# Patient Record
Sex: FEMALE | Race: ASIAN | NOT HISPANIC OR LATINO
[De-identification: names, ages, dates, MRNs, and addresses within clinical notes are randomized per-mention and may not be internally consistent; named-entity substitution may affect disease eponyms.]

---

## 2021-01-05 ENCOUNTER — DASHBOARD ENCOUNTERS (OUTPATIENT)
Age: 51
End: 2021-01-05

## 2021-01-05 ENCOUNTER — OFFICE VISIT (OUTPATIENT)
Dept: URBAN - METROPOLITAN AREA CLINIC 23 | Facility: CLINIC | Age: 51
End: 2021-01-05
Payer: COMMERCIAL

## 2021-01-05 ENCOUNTER — WEB ENCOUNTER (OUTPATIENT)
Dept: URBAN - METROPOLITAN AREA CLINIC 23 | Facility: CLINIC | Age: 51
End: 2021-01-05

## 2021-01-05 ENCOUNTER — LAB OUTSIDE AN ENCOUNTER (OUTPATIENT)
Dept: URBAN - METROPOLITAN AREA CLINIC 23 | Facility: CLINIC | Age: 51
End: 2021-01-05

## 2021-01-05 DIAGNOSIS — Z87.19 HISTORY OF GASTRIC POLYP: ICD-10-CM

## 2021-01-05 DIAGNOSIS — K29.50 OTHER CHRONIC GASTRITIS WITHOUT HEMORRHAGE: ICD-10-CM

## 2021-01-05 DIAGNOSIS — Z86.010 HISTORY OF ADENOMATOUS POLYP OF COLON: ICD-10-CM

## 2021-01-05 DIAGNOSIS — E61.1 IRON DEFICIENCY: ICD-10-CM

## 2021-01-05 PROBLEM — 429047008: Status: ACTIVE | Noted: 2021-01-05

## 2021-01-05 PROBLEM — 8493009: Status: ACTIVE | Noted: 2021-01-05

## 2021-01-05 PROCEDURE — G8420 CALC BMI NORM PARAMETERS: HCPCS | Performed by: INTERNAL MEDICINE

## 2021-01-05 PROCEDURE — 3017F COLORECTAL CA SCREEN DOC REV: CPT | Performed by: INTERNAL MEDICINE

## 2021-01-05 PROCEDURE — 99243 OFF/OP CNSLTJ NEW/EST LOW 30: CPT | Performed by: INTERNAL MEDICINE

## 2021-01-05 PROCEDURE — G9903 PT SCRN TBCO ID AS NON USER: HCPCS | Performed by: INTERNAL MEDICINE

## 2021-01-05 PROCEDURE — G9622 NO UNHEAL ETOH USER: HCPCS | Performed by: INTERNAL MEDICINE

## 2021-01-05 PROCEDURE — G8427 DOCREV CUR MEDS BY ELIG CLIN: HCPCS | Performed by: INTERNAL MEDICINE

## 2021-01-05 PROCEDURE — 1036F TOBACCO NON-USER: CPT | Performed by: INTERNAL MEDICINE

## 2021-01-05 RX ORDER — SODIUM, POTASSIUM,MAG SULFATES 17.5-3.13G
17.5-13.3-1.6 GM/177ML SOLUTION, RECONSTITUTED, ORAL ORAL AS DIRECTED
Qty: 354 MILLILITER | OUTPATIENT
Start: 2021-01-05

## 2021-01-05 NOTE — HPI-TODAY'S VISIT:
Dr. Raymon Lagos_____for iron def anemia_____ .   A copy of this document is being forwarded to the referring provider.  50-year-old [place] female presents for iron deficiency (low ferritin and high RDW). Also has history of adenomatous colon polyp and gastric polyp/gastritis.  She brought in pathology report in [place].  It was 2016.  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  Two colon polyps, which were tubular adenoma and inflammatory polyp.   One of the colon polyps was bigger than 1 cm (12mm). Recently she had annual checkup with the PCP, and iron deficiency was noted.  She is on comprehensive work-up.  She was recommended to have GI endoscopies. Currently she does not have any GI symptoms. Chronic constipation, 1 bowel movement every 2-3 days. The patient was referred by

## 2021-01-05 NOTE — PREVIOUS WORKUP REVIEWED
: ENDOSCOPIES:-EGD/colonoscopy/pathology 10/27/2016:A small polyp in the lower gastric body, removed with forceps.  Pathology came back as fundic gland polyp.  2 colon polyps, 12 mm and 6 mm in size.  Endoscopy mucosal resection done.  One was tubular adenoma with low-grade dysplasia and one with inflammatory polyp.  It was done in [place].LABS:  -Labs 12/4/2020:UA negative blood, WBC 4.0, hemoglobin 12, RDW 18.4 H, platelet 254, BUN 11, creatinine 0.64, total bilirubin 0.5, alkaline phosphatase 55, AST 21, ALT 15, ferritin 14 L, iron saturation 25%. IMAGES:

## 2021-01-20 ENCOUNTER — OFFICE VISIT (OUTPATIENT)
Dept: URBAN - METROPOLITAN AREA SURGERY CENTER 13 | Facility: SURGERY CENTER | Age: 51
End: 2021-01-20